# Patient Record
(demographics unavailable — no encounter records)

---

## 2025-02-03 NOTE — HISTORY OF PRESENT ILLNESS
[FreeTextEntry1] : Yue Duran is a 51 year old s/p hospitalization at [facility] for pyelonephritis.  According the discharge papers she had positive urine cultures, elevated white count, and fever.  Blood cultures negative according to report.  During hospitalization she had CT scan which showed cortical thinning of the right kidney, a right parapelvic renal cyst measuring 8.5 cm.  Also findings of hepatic hemangiomas which patient is following up with her medical doctor for as well as large uterus with leiomyomas.  Patient will follow-up with her gynecologist. Follow-up ultrasound mentioned that right parapelvic cyst does appear complex and is causing mass effect on the right kidney. Images not available to review.  Prior records including CT scan and ultrasound from 2020 reviewed. CT scan in 2020 documented hyperdense right parapelvic renal lesion and follow-up sonogram evaluated as a right parapelvic renal cyst. No hydronephrosis.  Currently patient reports feeling well.  Denies any flank pain or fevers. Recently completed cefdinir. Anticipated physiological fall (ex: syncope)

## 2025-02-03 NOTE — ASSESSMENT
[FreeTextEntry1] : 51-year-old with right parapelvic renal cyst status post hospitalization for right-sided pyelonephritis. Fever, right flank pain, and elevated white count.  In the setting of positive urine culture.  Report from CT and ultrasound reviewed.  Images not available to review. Outlined concern for documented complexity. Outlined concern for mass effect on kidney and differential of UPJ obstruction.  Plan -Repeat urine testing today -Renal sonogram to compare to ultrasound from 2020 -Nuclear medicine renal scan with Lasix to evaluate for UPJ obstruction -Return to office 3 to 4 weeks to review and determine plan of care [Urinary Tract Infection (599.0\N39.0)] : qualitative ~C was positive

## 2025-02-03 NOTE — HISTORY OF PRESENT ILLNESS
[FreeTextEntry1] : Yue Duran is a 51 year old s/p hospitalization at [facility] for pyelonephritis.  According the discharge papers she had positive urine cultures, elevated white count, and fever.  Blood cultures negative according to report.  During hospitalization she had CT scan which showed cortical thinning of the right kidney, a right parapelvic renal cyst measuring 8.5 cm.  Also findings of hepatic hemangiomas which patient is following up with her medical doctor for as well as large uterus with leiomyomas.  Patient will follow-up with her gynecologist. Follow-up ultrasound mentioned that right parapelvic cyst does appear complex and is causing mass effect on the right kidney. Images not available to review.  Prior records including CT scan and ultrasound from 2020 reviewed. CT scan in 2020 documented hyperdense right parapelvic renal lesion and follow-up sonogram evaluated as a right parapelvic renal cyst. No hydronephrosis.  Currently patient reports feeling well.  Denies any flank pain or fevers. Recently completed cefdinir.

## 2025-02-03 NOTE — ADDENDUM
[FreeTextEntry1] : Documented by KACY Evangelista acting as a scribe for Dr. Beryl Gonzalez   All medical record entries made by the Scribe were at my, Dr. Gonzalez direction and personally dictated by me.  I have reviewed the chart and agree that the record accurately reflects my personal performance of the history, physical exam, procedure and imaging.

## 2025-03-03 NOTE — HISTORY OF PRESENT ILLNESS
[FreeTextEntry1] : 51 year old s/p hospitalization at [facility] for pyelonephritis.  According the discharge papers she had positive urine cultures, elevated white count, and fever.  Blood cultures negative according to report.  During hospitalization she had CT scan which showed cortical thinning of the right kidney, a right parapelvic renal cyst measuring 8.5 cm.  Also findings of hepatic hemangiomas which patient is following up with her medical doctor for as well as large uterus with leiomyomas.  Patient will follow-up with her gynecologist. Follow-up ultrasound mentioned that right parapelvic cyst does appear complex and is causing mass effect on the right kidney.  she has persistent intermittent flank pain   CT scan 1/2025 large right - sided parapelvic cyst (simple fluid)- there is cortical thinning  Renal US 2/6/25 9.3 x 6.9 x 8.3 cm right parapelvic cyst - proteinaceous / blood product results in hydronephrosis  Renal Scan 2/10/25 - [facility] Bilateral normal renal blood flow and cortical function right hydronephrosis that responds to lasix   Prior records including CT scan and ultrasound from 2020 reviewed. CT scan in 2020 documented hyperdense right parapelvic renal lesion and follow-up sonogram evaluated as a right parapelvic renal cyst. No hydronephrosis.  Currently patient reports feeling well.  Denies any flank pain or fevers. Recently completed cefdinir. [Urinary Urgency] : no urinary urgency [Urinary Frequency] : no urinary frequency [Nocturia] : no nocturia [Straining] : no straining [Weak Stream] : no weak stream [Dysuria] : no dysuria [Hematuria - Gross] : no gross hematuria [Hematuria - Microscopic] : no microscopic hematuria [Bladder Spasm] : no bladder spasm [Flank Pain] : flank pain

## 2025-03-03 NOTE — ASSESSMENT
[FreeTextEntry1] : 51 year old s/p hospitalization at [facility] for pyelonephritis.  According the discharge papers she had positive urine cultures, elevated white count, and fever.  Blood cultures negative according to report.  During hospitalization she had CT scan which showed cortical thinning of the right kidney, a right parapelvic renal cyst measuring 8.5 cm.  Also findings of hepatic hemangiomas which patient is following up with her medical doctor for as well as large uterus with leiomyomas.  Patient will follow-up with her gynecologist. Follow-up ultrasound mentioned that right parapelvic cyst does appear complex and is causing mass effect on the right kidney.  she has persistent intermittent flank pain   CT scan 1/2025 large right - sided parapelvic cyst (simple fluid)- there is cortical thinning  Renal US 2/6/25 9.3 x 6.9 x 8.3 cm right parapelvic cyst - proteinaceous / blood product results in hydronephrosis  Renal Scan 2/10/25 - [facility] Bilateral normal renal blood flow and cortical function right hydronephrosis that responds to lasix   Prior records including CT scan and ultrasound from 2020 reviewed. CT scan in 2020 documented hyperdense right parapelvic renal lesion and follow-up sonogram evaluated as a right parapelvic renal cyst. No hydronephrosis.  Currently patient reports feeling well.  Denies any flank pain or fevers. Recently completed cefdinir.  Plan 52 yo with right sided flank pain the patient has clear cortical thinning and no clear evidence of obstruction - thei mages were reviewed - the CT scan was reviewed - IR for cyst drainage - will f/u in 6 months to re-assess  [Hydronephrosis (591\N13.30)] : Salter-Gentile type I